# Patient Record
Sex: FEMALE | Race: WHITE | NOT HISPANIC OR LATINO | Employment: FULL TIME
[De-identification: names, ages, dates, MRNs, and addresses within clinical notes are randomized per-mention and may not be internally consistent; named-entity substitution may affect disease eponyms.]

---

## 2018-10-15 ENCOUNTER — TRANSCRIBE ORDERS (OUTPATIENT)
Dept: SCHEDULING | Age: 58
End: 2018-10-15

## 2018-10-15 DIAGNOSIS — Z12.31 ENCOUNTER FOR SCREENING MAMMOGRAM FOR MALIGNANT NEOPLASM OF BREAST: Primary | ICD-10-CM

## 2019-04-03 ENCOUNTER — TRANSCRIBE ORDERS (OUTPATIENT)
Dept: RADIOLOGY | Facility: HOSPITAL | Age: 59
End: 2019-04-03

## 2019-04-03 ENCOUNTER — HOSPITAL ENCOUNTER (OUTPATIENT)
Dept: RADIOLOGY | Facility: HOSPITAL | Age: 59
Discharge: HOME | End: 2019-04-03
Attending: INTERNAL MEDICINE
Payer: COMMERCIAL

## 2019-04-03 DIAGNOSIS — J45.909 UNCOMPLICATED ASTHMA: Primary | ICD-10-CM

## 2019-04-03 DIAGNOSIS — J45.909 UNCOMPLICATED ASTHMA: ICD-10-CM

## 2019-04-03 PROCEDURE — 71046 X-RAY EXAM CHEST 2 VIEWS: CPT

## 2020-04-03 ENCOUNTER — TRANSCRIBE ORDERS (OUTPATIENT)
Dept: SCHEDULING | Age: 60
End: 2020-04-03

## 2020-04-03 DIAGNOSIS — E73.9 LACTOSE INTOLERANCE, UNSPECIFIED: Primary | ICD-10-CM

## 2020-04-13 ENCOUNTER — TELEMEDICINE (OUTPATIENT)
Dept: NUTRITION | Facility: CLINIC | Age: 60
End: 2020-04-13
Attending: INTERNAL MEDICINE
Payer: COMMERCIAL

## 2020-04-13 VITALS — BODY MASS INDEX: 23.04 KG/M2 | WEIGHT: 130 LBS | HEIGHT: 63 IN

## 2020-04-13 DIAGNOSIS — E73.9 LACTOSE INTOLERANCE, UNSPECIFIED: ICD-10-CM

## 2020-04-13 PROCEDURE — 97802 MEDICAL NUTRITION INDIV IN: CPT | Mod: GT

## 2020-04-13 NOTE — PROGRESS NOTES
"Subjective      Patient ID: Blade Arechiga is a 60 y.o. female.  1960      HPI    The following have been reviewed and updated as appropriate in this visit:       Review of Systems    Objective     Vitals:    04/13/20 1708   Weight: 59 kg (130 lb)   Height: 1.6 m (5' 3\")     Body mass index is 23.03 kg/m².    Physical Exam    Assessment/Plan a 3900-2266 kcal/d meal plan was written and emailed to pt.  Pt was instructed CHO counting, meal timing and label reading via Press with pt consent.  Pt was em simeon and we reviewed handouts:  Lactose intolerance, My food plan, post op vit/min supplements.  Pt was advised to cut back on the morning high fat PRO she was eating such as 5-6 strips of calderon,or 6 sausage links or 5-6 slices of scrapple.  This could possibly be the cause of loose bowels!  Advised to keep fat to 38g/d=25% of Total kcal/d.  Pt reports had Duodenal Switch March 2007.  Recommend weight maintenance.  Pt reports lost 110 lb..    Sincerely,    Tiear Jackson RDN,LDN,CDE  Diagnoses and all orders for this visit:    Lactose intolerance, unspecified  -     Ambulatory referral to Nutrition Services        "

## 2020-04-13 NOTE — PATIENT INSTRUCTIONS
Follow 4890-4821 calorie/d meal plan @40% carbohydrate =130-150g/d for weight maintenance  Recommend 25% of total calories from fat= 38g/day  You were advised to cut back on the morning pork sausage, calderon, scrapple amouts as I believe the high fat content of these foods is an issue for you and the Duodenal Switch  Read labels  Continue taking Vitamin/mineral regimen:  We discussed continue  Your [place] multi-vit/min, Calcium  Citrate 1200mg/d,I  recommended 10,000 IU Vit A, 400 IU Vit E, 300 mcg Vit K and 2,000 IU Vit D (you advised you have been taking 50,000 IU/d for 11 yr.-I verified this as usually a script would be this amount 1x wk for 6-8 wk)  You advised this is correct and what it takes to keep you at 32-35 which is the low end of normal for VIT D3)  We discussed adding fruit to dinner as you didn't have enough carbohydrate there.  We discussed using low fat cheese

## 2021-04-13 DIAGNOSIS — Z23 ENCOUNTER FOR IMMUNIZATION: ICD-10-CM

## 2023-05-25 ENCOUNTER — HOSPITAL ENCOUNTER (EMERGENCY)
Facility: HOSPITAL | Age: 63
Discharge: HOME | End: 2023-05-25
Attending: EMERGENCY MEDICINE
Payer: COMMERCIAL

## 2023-05-25 ENCOUNTER — APPOINTMENT (EMERGENCY)
Dept: RADIOLOGY | Facility: HOSPITAL | Age: 63
End: 2023-05-25
Attending: STUDENT IN AN ORGANIZED HEALTH CARE EDUCATION/TRAINING PROGRAM
Payer: COMMERCIAL

## 2023-05-25 ENCOUNTER — APPOINTMENT (EMERGENCY)
Dept: RADIOLOGY | Facility: HOSPITAL | Age: 63
End: 2023-05-25
Attending: EMERGENCY MEDICINE
Payer: COMMERCIAL

## 2023-05-25 VITALS
DIASTOLIC BLOOD PRESSURE: 66 MMHG | HEART RATE: 57 BPM | SYSTOLIC BLOOD PRESSURE: 128 MMHG | OXYGEN SATURATION: 99 % | TEMPERATURE: 97 F | RESPIRATION RATE: 16 BRPM

## 2023-05-25 DIAGNOSIS — S62.102A CLOSED FRACTURE OF LEFT WRIST, INITIAL ENCOUNTER: Primary | ICD-10-CM

## 2023-05-25 PROCEDURE — 73110 X-RAY EXAM OF WRIST: CPT | Mod: LT

## 2023-05-25 PROCEDURE — 96372 THER/PROPH/DIAG INJ SC/IM: CPT

## 2023-05-25 PROCEDURE — 63600000 HC DRUGS/DETAIL CODE: Mod: JZ

## 2023-05-25 PROCEDURE — 25605 CLTX DST RDL FX/EPHYS SEP W/: CPT | Mod: LT

## 2023-05-25 PROCEDURE — 0PSL35Z REPOSITION LEFT ULNA WITH EXTERNAL FIXATION DEVICE, PERCUTANEOUS APPROACH: ICD-10-PCS | Performed by: EMERGENCY MEDICINE

## 2023-05-25 PROCEDURE — 63700000 HC SELF-ADMINISTRABLE DRUG

## 2023-05-25 PROCEDURE — 73090 X-RAY EXAM OF FOREARM: CPT | Mod: LT

## 2023-05-25 PROCEDURE — 99283 EMERGENCY DEPT VISIT LOW MDM: CPT | Mod: 25

## 2023-05-25 PROCEDURE — 3E023NZ INTRODUCTION OF ANALGESICS, HYPNOTICS, SEDATIVES INTO MUSCLE, PERCUTANEOUS APPROACH: ICD-10-PCS | Performed by: EMERGENCY MEDICINE

## 2023-05-25 PROCEDURE — 73100 X-RAY EXAM OF WRIST: CPT | Mod: LT,59

## 2023-05-25 PROCEDURE — 0PSJ35Z REPOSITION LEFT RADIUS WITH EXTERNAL FIXATION DEVICE, PERCUTANEOUS APPROACH: ICD-10-PCS | Performed by: EMERGENCY MEDICINE

## 2023-05-25 RX ORDER — OXYCODONE AND ACETAMINOPHEN 5; 325 MG/1; MG/1
1 TABLET ORAL EVERY 8 HOURS PRN
Qty: 12 TABLET | Refills: 0 | Status: SHIPPED | OUTPATIENT
Start: 2023-05-25 | End: 2023-05-29

## 2023-05-25 RX ORDER — LORAZEPAM 2 MG/ML
1 INJECTION INTRAMUSCULAR ONCE
Status: COMPLETED | OUTPATIENT
Start: 2023-05-25 | End: 2023-05-25

## 2023-05-25 RX ADMIN — OXYCODONE HYDROCHLORIDE: 5 TABLET ORAL at 14:30

## 2023-05-25 RX ADMIN — LORAZEPAM 1 MG: 2 INJECTION INTRAMUSCULAR; INTRAVENOUS at 16:34

## 2023-05-25 NOTE — ED PROVIDER NOTES
Emergency Medicine Note  HPI   HISTORY OF PRESENT ILLNESS     Patient is a 63-year-old female with past medical history of hypothyroidism who presents today with left wrist pain after a fall yesterday.  Patient states that she was walking down her stairs when she accidentally tripped and fell onto her left wrist.  Patient states that she went to urgent care this morning with a did an x-ray of her left wrist which indicated a fracture of both her ulna and radius.  Patient states that she was given Motrin and recommended follow-up with a hand surgeon.  Patient states that she called multiple hand surgeons however they did not available until next week.  Patient states that she is here today for further evaluation.  Patient rates the pain a 7 out of 10.  Patient states that she took Motrin this morning without pain relief.  Patient is able to move her left wrist and hand, wiggle her fingers, unable to sense touch.  Patient states that there is mild numbness and tingling to her left hand.  Patient denies significant past medical history.  Patient denies any other injuries such as hitting her head or loss of consciousness.  Purely mechanical fall.  Patient denies allergies to medications.            Patient History   PAST HISTORY     Reviewed from Nursing Triage:       No past medical history on file.    Past Surgical History:   Procedure Laterality Date   • THYROIDECTOMY  2015       No family history on file.    Social History     Tobacco Use   • Smoking status: Never   • Smokeless tobacco: Never   Substance Use Topics   • Alcohol use: Never   • Drug use: Never         Review of Systems   REVIEW OF SYSTEMS     Review of Systems   Musculoskeletal:        Left wrist pain and swelling         VITALS     ED Vitals    Date/Time Temp Pulse Resp BP SpO2 [facility]   05/25/23 1327 -- 53 17 146/71 99 %    05/25/23 1220 36.4 °C (97.5 °F) 61 16 134/72 97 % [facility]                       Physical Exam   PHYSICAL EXAM     Physical  Exam  Constitutional:       Appearance: Normal appearance. She is normal weight.   HENT:      Head: Normocephalic and atraumatic.   Eyes:      Conjunctiva/sclera: Conjunctivae normal.   Cardiovascular:      Rate and Rhythm: Normal rate.   Pulmonary:      Effort: Pulmonary effort is normal.      Breath sounds: Normal breath sounds.   Musculoskeletal:      Left wrist: Swelling and tenderness present. Normal range of motion. Normal pulse.      Comments: Swelling, tenderness to palpation, ecchymosis to left wrist.  Patient is able to wiggle her fingers and flex and extend her wrist.  Radial pulse palpable.   Neurological:      Mental Status: She is alert.           PROCEDURES     Procedures     DATA     Results     None          Imaging Results    None         No orders to display       Scoring tools                                  ED Course & MDM   MDM / ED COURSE / CLINICAL IMPRESSION / DISPO     MDM    ED Course as of 05/25/23 1645   Thu May 25, 2023   1339 Impression-left wrist swelling, tenderness, ecchymosis after a fall that occurred last night.  Patient states that she was walking down her stairs when she fell on her left wrist.  Patient went to urgent care this morning where they performed an x-ray which showed fracture of radius and ulna.  Patient was not able to find a hand surgeon until next week.      Plan-patient brought disc of x-ray from urgent care.  Given to radiologist.  Calling Ortho resident. [MW]   [id number] ortho resident came to see patient, ordered post reduction x-ray of left wrist, he applied splint. Will most likely need surgery [MW]      ED Course User Index  [MW] Breana Castellano PA C     Clinical Impression      None               Breana Castellano PA C  05/25/23 5537

## 2023-05-25 NOTE — CONSULTS
Orthopedic Consult Note    Subjective     Blade Arechiga is a 63 y.o. female who is left-hand dominant with past medical history significant for thyroidectomy presents the ED today after a fall down the stairs at her home last night during which she sustained a injury to her left wrist. On x-rays a left distal radius distal ulnar fracture are seen. Orthopedics consulted for evaluation and management.  She noted immediate pain and deformity. Pain is severe. Pain is worse with movement and palpation. Pain is located in the wrist.  She does have some tingling in the first 4 fingers, worst in the second and third fingers, but this is getting better since injury she has pain related focal weakness in the hand. She is neurovascular intact.   The diagnosis and recommendation for splinting with outpatient follow-up and possible surgery were discussed with the patient.  Patient provided informed verbal consent for hematoma block reduction and splinting. Patient tolerated procedure well. She was placed in a sugar-tong splint. Return to ED instructions were discussed, patient noted understanding.  Patient has plans to follow-up with Dr. Carvajal at [place] tomorrow morning.  Patient's questions were answered.       Medical History:   No past medical history on file.    Surgical History:   Past Surgical History:   Procedure Laterality Date   • THYROIDECTOMY  2015       Social History:   Social History     Social History Narrative   • Not on file       Family History:   No family history on file.    Allergies: Lactose    No current facility-administered medications for this encounter.       Review of Systems  Pertinent items are noted in HPI.    Objective   Labs  No labs ordered    Imaging  X-rays of the left wrist: Dorsally angulated distal radius fracture with associated ulnar fracture.  The fracture is displaced more than 100% and is more than 2 cm shortened.    X-rays left forearm: No acute fracture dislocation noted  Final  reduction x-rays of the left wrist demonstrate improved alignment of the fractures.  There is still some dorsal displacement, but length is restored.  Reduction was carried out 2 times, and initial postreduction x-rays show persistent displacement.      Physical Exam  General: No acute distress  Respiratory: Breathing unlabored  Cardio: no edema or cyanosis  Skin: no rashes       LUE  Mild deformity of the wrist  Skin intact; no poke hole, laceration, abrasion or ulceration.  There is ecchymosis volarly  TTP of the wrist  No TTP bony prominences elsewhere.   No pain with ROM shoulder/elbow  Pain in the wrist with ROM of the fingers  ROM of the wrist deferred  Motor intact axillary/msk/median/radial/ulnar/AIN/PIN  SILT axillary/median/radial/ulnar n--some tingling in the median distribution  Compartments soft and compressible  Hand WWP, radial pulse 2+     RUE  No obvious deformity.  No TTP bony prominences. No palpable crepitus.  No pain with ROM shoulder/elbow/wrist/fingers  Motor intact axillary/msk/median/radial/ulnar/AIN/PIN  SILT axillary/median/radial/ulnar n  Compartments soft and compressible  Hand WWP     RLE  No obvious deformity.  No pain with ROM hip/knee/ankle  Motor and SILT grossly intact  Compartments soft and compressible     LLE  No obvious deformity.  No pain with ROM hip/knee/ankle  Motor and sensory grossly intact  Compartments soft and compressible     Patient was able to painlessly ambulate into hospital    Procedure:  Diagnosis: Distal radius fracture  Procedure: Left wrist closed reduction and splinting.     Procedure details:   After being explained risks, benefits and alternatives to closed reduction and splinting, verbal consent was obtained.   The wrist was prepped and under sterile conditions a hematoma block was performed.  10 cc of 2% lidocaine without epi was injected into dorsal distal radius. Patient was hung in finger traps.  A closed reduction was then performed and a well padded  plaster splint was then applied. The patient tolerated well without complication. Patient neurovascular and gross motor exam to the digits was unchanged post procedure. Post reduction XR were obtained which showed persistent displacement.  The procedure was repeated, and final repeat x-rays demonstrate improvement in alignment. The need for possible/liekly surgical intervention was discussed with patient who noted understanding.     Assessment   63 y.o. female being consulted for left distal radius fracture    Plan     Imaging reviewed  Hematoma block and reduction and splinting performed after verbal consent obtained  Alignment improved  Nonweightbearing to left upper extremity in splint  Rest ice and elevation  Pain control per ED  Recommendation for likely ORIF as an outpatient discussed with patient and noted understanding  Need for outpatient follow-up discussed patient noted understanding; patient plans to follow-up with a hand surgeon at [place] tomorrow morning at 9:00.  Need to keep splint dry discussed  Return to ED instructions discussed  Plan discussed with ED provider, patient and attending    Ajay Arndt DO   Orthopedic Surgery, PGY2  Pager: [phone]

## 2023-05-25 NOTE — CONSULTS
Orthopedic Consult Note    Subjective     Blade Arechiga is a 63 y.o. female with PMH of *** who presents ***    Medical History: No past medical history on file.    Surgical History:   Past Surgical History:   Procedure Laterality Date   • THYROIDECTOMY  2015       Social History:   Social History     Social History Narrative   • Not on file       Family History: Non-contributory at this time.     Allergies: Lactose    No current facility-administered medications for this encounter.         Review of Systems  {Ros - Complete:58346}    Objective   Labs                Physical Exam    General: AAOx***, verbally responds to questions, responds to stimuli    Head/neck:   No gross deformity of the face or skull   No c-spine tenderness over the spinous processes or paraspinal muscles      Pelvis:   No gross deformity  No pain with compression of the iliac crests, no instability    Extremities:  RUE:   No gross deformity  Skin intact, no abrasions or lacerations, no ecchymoses, no edema  No TTP along clavicle, AC joint, acromion, humeral head, medial and lateral humeral epicondyles, olecranon, radial head, distal radius  Compartments soft and compressible  Shoulder forward flexes to 180, abd to 90 without pain  Flexes/extends and supinates/pronates elbow without pain  Flexes/extends wrist without pain   Motor intact M/AIN/U/R/PIN  SILT C5-T1 BL  2+ radial pulse, fingers WWP w BCR    LUE:   No gross deformity  Skin intact, no abrasions or lacerations, no ecchymoses, no edema  No TTP along clavicle, AC joint, acromion, humeral head, medial and lateral humeral epicondyles, olecranon, radial head, distal radius  Compartments soft and compressible  Shoulder forward flexes to 180, abd to 90 without pain  Flexes/extends and supinates/pronates elbow without pain  Flexes/extends wrist without pain   Motor intact M/AIN/U/R/PIN  SILT C5-T1 BL  2+ radial pulse, fingers WWP w BCR    RLE:   No gross deformity  Skin intact, no  abrasions or lacerations, no ecchymoses, no edema  No TTP  Compartments soft and compressible  Flexes/extends hip without pain  Flexes/extends knee without pain  Motor intact FHL/EHL/TA/GSC/Q/HS  SILT DP/SP/T/S/S  2+PT and DP pulses  Foot WWP w BCR  Negative log roll, negative heel strike    LLE:  No gross deformity  Skin intact, no abrasions or lacerations, no ecchymoses, no edema  No TTP  Compartments soft and compressible  Flexes/extends hip without pain  Flexes/extends knee without pain  Motor intact FHL/EHL/TA/GSC/Q/HS  SILT DP/SP/T/S/S  2+PT and DP pulses  Foot WWP w BCR  Negative log roll, negative heel strike      Imaging:  X-Ray of *** demonstrates ***.       Assessment     63 y.o. female ***     Plan     ***    Ajay Arndt DO, PGY-2  Orthopedic Surgery  Pager: [phone]

## 2023-05-25 NOTE — ED ATTESTATION NOTE
I have personally seen and examined Blade Arechiga.  I was involved in the care and medical decision making for this patient.    I reviewed and agree with physician assistant / nurse practitioner’s assessment and plan of care; any exceptions are documented below.      My focused history, examination, assessment and plan of care of Blade Arechiga is as follows:    Brief History:  HPI  63-year-old female who is left-hand dominant tripped walking up the stairs yesterday injuring her left wrist.  Patient was seen in an urgent care today and was diagnosed with a left wrist fracture.  Patient was sent here.      Focused Physical Exam:  Physical Exam  Patient is awake alert in no acute distress.  Patient has swan-neck deformity of her left wrist with bruising and tenderness.  She has a superficial abrasion to her ventral surface of her left wrist.      Assessment / Plan / MDM:  MDM    Patient brought copies of her x-rays were reviewed.  Orthopedics was consulted.  I saw and evaluated the patient and reduced her twice.  They splinted her.  Patient was cleared for discharged to follow-up with a hand specialist as an outpatient and get surgery as an outpatient.           Andreia Burns MD  05/25/23 1579

## 2023-05-25 NOTE — DISCHARGE INSTRUCTIONS
You have been evaluated in the Emergency Department today for wrist pain after a fall. Your evaluation, including physical exam and x-ray, has revealed that you have a fracture of your left wrist. Please continue to wear the sling.     You can alternate Tylenol and Motrin every 4-6 hours to help control your pain as directed on the package. Please also rest, ice, and elevate your arm to control pain and inflammation.  He also prescribed Percocet.  Please take as needed for pain.  Please do not operate heavy machinery, drink alcohol, or drive will take this medication as can be sleepy.    Please follow-up tomorrow at [facility] for your appointment with orthopedics.    Return to the Emergency Department if you experience worsening or uncontrolled pain, numbness or weakness to your hand, color change to your hand, or any other concerning symptoms.

## 2023-07-20 ENCOUNTER — TRANSCRIBE ORDERS (OUTPATIENT)
Dept: SCHEDULING | Age: 63
End: 2023-07-20

## 2023-07-20 DIAGNOSIS — M80.00XA AGE-RELATED OSTEOPOROSIS WITH CURRENT PATHOLOGICAL FRACTURE, UNSPECIFIED SITE, INITIAL ENCOUNTER FOR FRACTURE: Primary | ICD-10-CM

## 2023-08-09 ENCOUNTER — HOSPITAL ENCOUNTER (OUTPATIENT)
Dept: RADIOLOGY | Age: 63
Discharge: HOME | End: 2023-08-09
Attending: INTERNAL MEDICINE
Payer: COMMERCIAL

## 2023-08-09 ENCOUNTER — TRANSCRIBE ORDERS (OUTPATIENT)
Dept: RADIOLOGY | Age: 63
End: 2023-08-09

## 2023-08-09 DIAGNOSIS — M80.00XA AGE-RELATED OSTEOPOROSIS WITH CURRENT PATHOLOGICAL FRACTURE, UNSPECIFIED SITE, INITIAL ENCOUNTER FOR FRACTURE: ICD-10-CM

## 2023-08-09 DIAGNOSIS — M80.00XA AGE-RELATED OSTEOPOROSIS WITH CURRENT PATHOLOGICAL FRACTURE, UNSPECIFIED SITE, INITIAL ENCOUNTER FOR FRACTURE: Primary | ICD-10-CM

## 2023-08-09 PROCEDURE — 77080 DXA BONE DENSITY AXIAL: CPT

## 2023-12-01 ENCOUNTER — TRANSCRIBE ORDERS (OUTPATIENT)
Dept: SCHEDULING | Age: 63
End: 2023-12-01

## 2023-12-01 DIAGNOSIS — R33.9 RETENTION OF URINE, UNSPECIFIED: Primary | ICD-10-CM

## 2023-12-08 ENCOUNTER — HOSPITAL ENCOUNTER (OUTPATIENT)
Dept: RADIOLOGY | Facility: CLINIC | Age: 63
Discharge: HOME | End: 2023-12-08
Attending: NURSE PRACTITIONER
Payer: COMMERCIAL

## 2023-12-08 DIAGNOSIS — R33.9 RETENTION OF URINE, UNSPECIFIED: ICD-10-CM

## 2023-12-08 PROCEDURE — 76770 US EXAM ABDO BACK WALL COMP: CPT

## 2024-06-10 ENCOUNTER — TRANSCRIBE ORDERS (OUTPATIENT)
Dept: REGISTRATION | Facility: CLINIC | Age: 64
End: 2024-06-10

## 2024-06-10 DIAGNOSIS — R19.7 DIARRHEA: Primary | ICD-10-CM

## 2024-06-11 ENCOUNTER — TRANSCRIBE ORDERS (OUTPATIENT)
Dept: RADIOLOGY | Age: 64
End: 2024-06-11

## 2024-06-11 ENCOUNTER — HOSPITAL ENCOUNTER (OUTPATIENT)
Dept: RADIOLOGY | Age: 64
Discharge: HOME | End: 2024-06-11
Attending: INTERNAL MEDICINE
Payer: COMMERCIAL

## 2024-06-11 DIAGNOSIS — R19.7 DIARRHEA, UNSPECIFIED: ICD-10-CM

## 2024-06-11 DIAGNOSIS — R19.7 DIARRHEA, UNSPECIFIED: Primary | ICD-10-CM

## 2024-06-11 PROCEDURE — 74019 RADEX ABDOMEN 2 VIEWS: CPT

## 2024-06-12 ENCOUNTER — APPOINTMENT (OUTPATIENT)
Dept: LAB | Age: 64
End: 2024-06-12
Attending: INTERNAL MEDICINE
Payer: COMMERCIAL

## 2024-06-12 ENCOUNTER — TRANSCRIBE ORDERS (OUTPATIENT)
Dept: LAB | Age: 64
End: 2024-06-12

## 2024-06-12 DIAGNOSIS — Z01.812 ENCOUNTER FOR PREPROCEDURAL LABORATORY EXAMINATION: ICD-10-CM

## 2024-06-12 DIAGNOSIS — Z01.818 ENCOUNTER FOR OTHER PREPROCEDURAL EXAMINATION: Primary | ICD-10-CM

## 2024-06-12 DIAGNOSIS — Z01.818 ENCOUNTER FOR OTHER PREPROCEDURAL EXAMINATION: ICD-10-CM

## 2024-06-12 LAB
ERYTHROCYTE [DISTWIDTH] IN BLOOD BY AUTOMATED COUNT: 14.6 % (ref 11.7–14.4)
HCT VFR BLD AUTO: 35.6 % (ref 35–45)
HGB BLD-MCNC: 11.5 G/DL (ref 11.8–15.7)
MCH RBC QN AUTO: 30.3 PG (ref 28–33.2)
MCHC RBC AUTO-ENTMCNC: 32.3 G/DL (ref 32.2–35.5)
MCV RBC AUTO: 93.7 FL (ref 83–98)
PDW BLD AUTO: 11.5 FL (ref 9.4–12.3)
PLATELET # BLD AUTO: 218 K/UL (ref 150–369)
RBC # BLD AUTO: 3.8 M/UL (ref 3.93–5.22)
WBC # BLD AUTO: 6.04 K/UL (ref 3.8–10.5)

## 2024-06-12 PROCEDURE — 36415 COLL VENOUS BLD VENIPUNCTURE: CPT

## 2024-06-12 PROCEDURE — 85027 COMPLETE CBC AUTOMATED: CPT
